# Patient Record
Sex: FEMALE
[De-identification: names, ages, dates, MRNs, and addresses within clinical notes are randomized per-mention and may not be internally consistent; named-entity substitution may affect disease eponyms.]

---

## 2020-09-01 ENCOUNTER — APPOINTMENT (OUTPATIENT)
Dept: PULMONOLOGY | Facility: CLINIC | Age: 60
End: 2020-09-01
Payer: COMMERCIAL

## 2020-09-01 VITALS
HEART RATE: 71 BPM | OXYGEN SATURATION: 95 % | HEIGHT: 61.5 IN | WEIGHT: 123 LBS | TEMPERATURE: 96.6 F | SYSTOLIC BLOOD PRESSURE: 148 MMHG | RESPIRATION RATE: 12 BRPM | BODY MASS INDEX: 22.92 KG/M2 | DIASTOLIC BLOOD PRESSURE: 91 MMHG

## 2020-09-01 PROCEDURE — 94726 PLETHYSMOGRAPHY LUNG VOLUMES: CPT

## 2020-09-01 PROCEDURE — 94060 EVALUATION OF WHEEZING: CPT

## 2020-09-01 PROCEDURE — 99204 OFFICE O/P NEW MOD 45 MIN: CPT | Mod: 25

## 2020-09-01 PROCEDURE — 95012 NITRIC OXIDE EXP GAS DETER: CPT

## 2020-09-01 PROCEDURE — 94729 DIFFUSING CAPACITY: CPT

## 2020-09-01 NOTE — HISTORY OF PRESENT ILLNESS
[Never] : never [TextBox_4] : Patient is a 60-year-old non-smoking female who is referred today for pulmonary consult.  The patient complains of cough shortness of breath and dyspnea on exertion.  She states that she was diagnosed with asthma in the past but currently is on no medication

## 2020-09-01 NOTE — ASSESSMENT
[FreeTextEntry1] : In summary the patient is a 60-year-old female who presents today for pulmonary consultation.  The patient's history and physical is consistent with poorly controlled asthma.\par \par The patient underwent a pulmonary function test which revealed severe obstructive airways disease with excellent response post bronchodilator therapy\par FeNO 153 PPB\par Patient is now started on Symbicort and Ventolin and instructed to follow-up in 1 month

## 2020-09-08 RX ORDER — ALBUTEROL SULFATE 90 UG/1
108 (90 BASE) AEROSOL, METERED RESPIRATORY (INHALATION) EVERY 6 HOURS
Qty: 1 | Refills: 3 | Status: ACTIVE | COMMUNITY
Start: 2020-09-08 | End: 1900-01-01

## 2020-10-06 ENCOUNTER — APPOINTMENT (OUTPATIENT)
Dept: PULMONOLOGY | Facility: CLINIC | Age: 60
End: 2020-10-06
Payer: COMMERCIAL

## 2020-10-06 VITALS — DIASTOLIC BLOOD PRESSURE: 80 MMHG | SYSTOLIC BLOOD PRESSURE: 140 MMHG

## 2020-10-06 VITALS
WEIGHT: 124 LBS | HEIGHT: 61.5 IN | BODY MASS INDEX: 23.11 KG/M2 | SYSTOLIC BLOOD PRESSURE: 191 MMHG | HEART RATE: 65 BPM | TEMPERATURE: 97.1 F | OXYGEN SATURATION: 98 % | DIASTOLIC BLOOD PRESSURE: 93 MMHG | RESPIRATION RATE: 14 BRPM

## 2020-10-06 PROCEDURE — 99214 OFFICE O/P EST MOD 30 MIN: CPT

## 2020-10-06 RX ORDER — ALBUTEROL SULFATE 90 UG/1
108 (90 BASE) INHALANT RESPIRATORY (INHALATION) EVERY 6 HOURS
Qty: 2 | Refills: 6 | Status: ACTIVE | COMMUNITY
Start: 2020-10-06 | End: 1900-01-01

## 2020-10-06 NOTE — ASSESSMENT
[FreeTextEntry1] : In summary the patient is a 60 year old female with asthma who presents for follow up. Patient's physical exam is significant for good air entry bilaterally. Rx for Symbicort given. Patient instructed to continue current medications and to follow up in 3 months

## 2020-10-06 NOTE — HISTORY OF PRESENT ILLNESS
[TextBox_4] : Patient is a 60 year old female with past medical history significant for asthma who presents today for follow up. Patient states that her asthma has resolved with the use of Symbicort. Patient denies any fevers, chills, chest pain, abdominal pain or shortness of breath

## 2020-10-08 ENCOUNTER — APPOINTMENT (OUTPATIENT)
Dept: PULMONOLOGY | Facility: CLINIC | Age: 60
End: 2020-10-08

## 2021-01-05 ENCOUNTER — APPOINTMENT (OUTPATIENT)
Dept: PULMONOLOGY | Facility: CLINIC | Age: 61
End: 2021-01-05
Payer: COMMERCIAL

## 2021-01-05 VITALS
SYSTOLIC BLOOD PRESSURE: 145 MMHG | WEIGHT: 124 LBS | DIASTOLIC BLOOD PRESSURE: 76 MMHG | HEART RATE: 74 BPM | TEMPERATURE: 98.1 F | RESPIRATION RATE: 14 BRPM | HEIGHT: 61.5 IN | BODY MASS INDEX: 23.11 KG/M2 | OXYGEN SATURATION: 98 %

## 2021-01-05 DIAGNOSIS — Z00.00 ENCOUNTER FOR GENERAL ADULT MEDICAL EXAMINATION W/OUT ABNORMAL FINDINGS: ICD-10-CM

## 2021-01-05 PROCEDURE — 99214 OFFICE O/P EST MOD 30 MIN: CPT

## 2021-01-05 PROCEDURE — 99072 ADDL SUPL MATRL&STAF TM PHE: CPT

## 2021-01-05 NOTE — HISTORY OF PRESENT ILLNESS
[Former] : former [Never] : never [TextBox_4] : Patient is a 60 year old female with asthma/ copd who presents today for follow up.\par Patient states that her cough and shortness of breath have improved with the use of Symbicort\par Patient denies any fevers chills chest pain, abdominal pain \par

## 2021-04-13 ENCOUNTER — APPOINTMENT (OUTPATIENT)
Dept: PULMONOLOGY | Facility: CLINIC | Age: 61
End: 2021-04-13

## 2021-05-18 ENCOUNTER — APPOINTMENT (OUTPATIENT)
Dept: PULMONOLOGY | Facility: CLINIC | Age: 61
End: 2021-05-18
Payer: COMMERCIAL

## 2021-05-18 VITALS
DIASTOLIC BLOOD PRESSURE: 80 MMHG | OXYGEN SATURATION: 98 % | SYSTOLIC BLOOD PRESSURE: 148 MMHG | WEIGHT: 127 LBS | TEMPERATURE: 98.3 F | HEIGHT: 61.5 IN | RESPIRATION RATE: 14 BRPM | HEART RATE: 68 BPM | BODY MASS INDEX: 23.67 KG/M2

## 2021-05-18 PROCEDURE — 99214 OFFICE O/P EST MOD 30 MIN: CPT

## 2021-06-24 NOTE — HISTORY OF PRESENT ILLNESS
[Former] : former [Never] : never [TextBox_4] : Patient is a 61-year-old female with past medical history significant for asthma/COPD who presents today for follow-up.  Patient states that her cough shortness of breath and dyspnea on exertion have improved with the use of her long-acting beta agonist and inhaled corticosteroid.  She currently denies fevers chills chest pain weight loss or hemoptysis

## 2021-06-24 NOTE — ASSESSMENT
[FreeTextEntry1] : In summary the patient is a 7 year old female with asthma who presents for follow up. Patient's physical exam is significant for good air entry bilaterally. Rx for Symbicort given. Patient instructed to continue current medications and to follow up in 3 months

## 2021-09-07 ENCOUNTER — APPOINTMENT (OUTPATIENT)
Dept: PULMONOLOGY | Facility: CLINIC | Age: 61
End: 2021-09-07

## 2022-09-06 ENCOUNTER — APPOINTMENT (OUTPATIENT)
Dept: PULMONOLOGY | Facility: CLINIC | Age: 62
End: 2022-09-06

## 2022-09-15 ENCOUNTER — APPOINTMENT (OUTPATIENT)
Dept: PULMONOLOGY | Facility: CLINIC | Age: 62
End: 2022-09-15

## 2022-09-15 VITALS
SYSTOLIC BLOOD PRESSURE: 148 MMHG | TEMPERATURE: 98 F | HEART RATE: 68 BPM | OXYGEN SATURATION: 96 % | DIASTOLIC BLOOD PRESSURE: 84 MMHG | RESPIRATION RATE: 14 BRPM

## 2022-09-15 DIAGNOSIS — J45.909 UNSPECIFIED ASTHMA, UNCOMPLICATED: ICD-10-CM

## 2022-09-15 DIAGNOSIS — Z78.9 OTHER SPECIFIED HEALTH STATUS: ICD-10-CM

## 2022-09-15 DIAGNOSIS — Z82.5 FAMILY HISTORY OF ASTHMA AND OTHER CHRONIC LOWER RESPIRATORY DISEASES: ICD-10-CM

## 2022-09-15 DIAGNOSIS — R05.3 CHRONIC COUGH: ICD-10-CM

## 2022-09-15 PROCEDURE — 99214 OFFICE O/P EST MOD 30 MIN: CPT

## 2022-09-15 NOTE — ASSESSMENT
[FreeTextEntry1] : In summary the patient is a 62  year old female with asthma who presents for follow up. Patient's physical exam is significant for good air entry bilaterally. Rx for Symbicort given. Patient instructed to continue current medications and to follow up in 3 months

## 2022-09-15 NOTE — HISTORY OF PRESENT ILLNESS
[Former] : former [Never] : never [TextBox_4] : Patient is a 62-year-old female past medical history significant for asthma who presents today for follow-up.  The patient complains of cough shortness of breath and dyspnea on exertion.  She recently ran out of her medications

## 2022-11-03 RX ORDER — BUDESONIDE AND FORMOTEROL FUMARATE DIHYDRATE 80; 4.5 UG/1; UG/1
80-4.5 AEROSOL RESPIRATORY (INHALATION)
Qty: 1 | Refills: 6 | Status: ACTIVE | COMMUNITY
Start: 2020-09-08 | End: 1900-01-01